# Patient Record
Sex: FEMALE | NOT HISPANIC OR LATINO | Employment: OTHER | URBAN - METROPOLITAN AREA
[De-identification: names, ages, dates, MRNs, and addresses within clinical notes are randomized per-mention and may not be internally consistent; named-entity substitution may affect disease eponyms.]

---

## 2023-01-27 ENCOUNTER — TRANSFERRED RECORDS (OUTPATIENT)
Dept: HEALTH INFORMATION MANAGEMENT | Facility: CLINIC | Age: 73
End: 2023-01-27

## 2023-01-27 LAB
ALT SERPL-CCNC: 23 U/L
AST SERPL-CCNC: 21 U/L (ref 14–36)
CHOLESTEROL (EXTERNAL): 218 MG/DL (ref 0–200)
CREATININE (EXTERNAL): 0.6 MG/DL (ref 0.7–1.4)
GFR ESTIMATED (EXTERNAL): >60 ML/MIN/1.73M2
GLUCOSE (EXTERNAL): 241 MG/DL (ref 60–99)
HBA1C MFR BLD: 10.43 % (ref 4–5.6)
HDLC SERPL-MCNC: 54 MG/DL (ref 35–65)
LDL CHOLESTEROL CALCULATED (EXTERNAL): 118 MG/DL (ref 0–130)
POTASSIUM (EXTERNAL): 4.4 MMOL/L (ref 3.5–5.1)
TRIGLYCERIDES (EXTERNAL): 228 MG/DL (ref 0–200)
TSH SERPL-ACNC: 1.82 MIU/L (ref 0.47–4.68)

## 2023-03-24 ENCOUNTER — MEDICAL CORRESPONDENCE (OUTPATIENT)
Dept: HEALTH INFORMATION MANAGEMENT | Facility: CLINIC | Age: 73
End: 2023-03-24
Payer: MEDICARE

## 2023-03-24 ENCOUNTER — TRANSFERRED RECORDS (OUTPATIENT)
Dept: HEALTH INFORMATION MANAGEMENT | Facility: CLINIC | Age: 73
End: 2023-03-24
Payer: MEDICARE

## 2023-03-27 ENCOUNTER — TRANSCRIBE ORDERS (OUTPATIENT)
Dept: OTHER | Age: 73
End: 2023-03-27

## 2023-03-27 DIAGNOSIS — R07.9 CHEST PAIN: Primary | ICD-10-CM

## 2023-05-05 ENCOUNTER — MEDICAL CORRESPONDENCE (OUTPATIENT)
Dept: HEALTH INFORMATION MANAGEMENT | Facility: CLINIC | Age: 73
End: 2023-05-05
Payer: MEDICARE

## 2023-05-19 ENCOUNTER — TRANSFERRED RECORDS (OUTPATIENT)
Dept: HEALTH INFORMATION MANAGEMENT | Facility: CLINIC | Age: 73
End: 2023-05-19

## 2023-05-25 ENCOUNTER — OFFICE VISIT (OUTPATIENT)
Dept: CARDIOLOGY | Facility: CLINIC | Age: 73
End: 2023-05-25
Payer: MEDICARE

## 2023-05-25 VITALS
BODY MASS INDEX: 30.7 KG/M2 | SYSTOLIC BLOOD PRESSURE: 128 MMHG | RESPIRATION RATE: 16 BRPM | HEIGHT: 64 IN | WEIGHT: 179.8 LBS | DIASTOLIC BLOOD PRESSURE: 64 MMHG | HEART RATE: 72 BPM

## 2023-05-25 DIAGNOSIS — R07.9 CHEST PAIN, UNSPECIFIED TYPE: Primary | ICD-10-CM

## 2023-05-25 DIAGNOSIS — R06.09 EXERTIONAL DYSPNEA: ICD-10-CM

## 2023-05-25 DIAGNOSIS — E11.9 TYPE 2 DIABETES MELLITUS WITHOUT COMPLICATION, UNSPECIFIED WHETHER LONG TERM INSULIN USE (H): ICD-10-CM

## 2023-05-25 DIAGNOSIS — I35.0 NONRHEUMATIC AORTIC VALVE STENOSIS: ICD-10-CM

## 2023-05-25 DIAGNOSIS — I10 PRIMARY HYPERTENSION: ICD-10-CM

## 2023-05-25 PROCEDURE — 99204 OFFICE O/P NEW MOD 45 MIN: CPT | Performed by: INTERNAL MEDICINE

## 2023-05-25 RX ORDER — GLIMEPIRIDE 4 MG/1
4 TABLET ORAL DAILY
COMMUNITY
Start: 2023-05-05

## 2023-05-25 RX ORDER — OMEPRAZOLE 40 MG/1
40 CAPSULE, DELAYED RELEASE ORAL DAILY
COMMUNITY
Start: 2023-03-24

## 2023-05-25 RX ORDER — ROSUVASTATIN CALCIUM 40 MG/1
40 TABLET, COATED ORAL DAILY
COMMUNITY
Start: 2023-05-05 | End: 2024-01-30

## 2023-05-25 RX ORDER — ASPIRIN 325 MG
325 TABLET, DELAYED RELEASE (ENTERIC COATED) ORAL DAILY
COMMUNITY
Start: 2021-09-01

## 2023-05-25 RX ORDER — LISINOPRIL 10 MG/1
10 TABLET ORAL DAILY
COMMUNITY
End: 2024-01-30

## 2023-05-25 NOTE — LETTER
5/25/2023    Alba Hernandez MD  [facility] [address] [place] [id number]    RE: Rina Elizondo       Dear Colleague,     I had the pleasure of seeing Rina Elizondo in the [facility].    HEART CARE ENCOUNTER CONSULTATON NOTE      [facility]  [phone]      Assessment/Recommendations   Assessment:   1.  Exertional dyspnea  2.  Intermittent chest pain  3.  Systolic murmur, likely mild to moderate aortic stenosis vs. LVOT gradient from LV hypertrophy   4.  Diabetes mellitus type II  5.  Hypertension  6.  Hyperlipidemia, LDL: 118  7. DAY  8. Pitting lower ext edema.     Plan:   1.  Coronary CT angiogram will coronary at [facility]  2.  Complete echocardiogram   3.  Continue ASA  4.  Discussed the possibility of further testing such as coronary angiogram or adjustment of medications may be required.  5.  Low-sodium diet  6.  Continue Crestor    Primary language: Angolan     History of Present Illness/Subjective    HPI: Rina Elizondo is a 73 year old female history of hypertension and hyperlipidemia who presents to cardiology clinic at [facility] for dyspnea on exertion, chest pain, lower extremity edema, abnormal stress test.    Patient notes dyspnea with exertion which is been ongoing for a couple of years.  She also notes intermittent episodes of chest discomfort which are with and without exertion.  She has intermittent lower extremity edema.  No orthopnea or PND symptoms.  No recent syncopal episodes.  She has history of intermittent episodes of near syncope.    Long conversation with the patient and her daughter regarding her symptoms and prior history.  I reviewed that prior stress test was equivocal given abnormal EKG portion with normal stress imaging portion at [facility].  In the past she was supposed undergo a Lexiscan stress test but the patient had concerns regarding the medications.  I outlined my concern about  [facility]  Procedure Note           Circumcision:       Baby1 Babs Juarez  MRN# [id number]   Guera 15, 2017, 9:33 AM Indication: parental preference           Procedure performed: Guera 15, 2017, 9:33 AM   Consent: Informed consent was obtained from the parent(s)   Pre-procedure: The area was prepped with betadine, then draped in a sterile fashion. Sterile gloves were worn at all times during the procedure.   Device used: Gomco 1.1 clamp   Anesthesia: Dorsal nerve block - 1% Lidocaine without epinephrine was infiltrated with a total of 0.8cc  Oral sucrose   Blood loss: Less than 1cc    Complications:: None at this time      Procedure:  Informed consent obtained, patient was identified, and patient was prepped in a sterile manner. 0.8 cc of Lidocaine was used as well as sweetease for anesthesia.  Foreskin excised with Gomco.  Patient tolerated the procedure well and no complications noted.          Recorded by Andrew Royal   "coronary artery disease and valvular heart disease as a cause of some of her symptoms.  Also recommend echocardiogram to further evaluate for left ventricular outflow tract gradient as she has noted intermittent episodes of lightheadedness and lower extremity edema.  Interestingly her edema improved with hydration and salt tabs in the past.      She is willing to undergo testing as outlined above.  She will continue her current medications.      Stress Echocardiogram:10/21/2021  Summary     1. The patient exercised for 5 minutes and 5 seconds on the  [place]   protocol.     2. Above normal functional capacity for age.     3. The patient experienced dizziness and heart fluttering during the   stress   test. Symptoms resolved during recovery.     4. Hypertensive response to stress with no ectopy noted.     5. Borderline positive stress ECG for ST segment depression.     6. Normal left ventricular systolic function with no regional wall motion   abnormalities noted at rest.   LVEF 65%.     7. Mild concentric LVH.  Minimal LV outflow gradient at rest.     8. Technically difficult study with off axis views.     9. Cannot rule out mid lateral hypokinesis but this was not seen on   recovery   images.     10. LV cavity appear larger on some stress images but images were off   axis.     11. Equivocal stress echocardiogram recommend a treadmill nuclear stress   test for further evaluation.        Physical Examination  Review of Systems   Vitals: /64 (BP Location: Left arm, Patient Position: Sitting, Cuff Size: Adult Large)   Pulse 72   Resp 16   Ht 1.626 m (5' 4\")   Wt 81.6 kg (179 lb 12.8 oz)   BMI 30.86 kg/m    BMI= Body mass index is 30.86 kg/m .  Wt Readings from Last 3 Encounters:   05/25/23 81.6 kg (179 lb 12.8 oz)        Mild obesity, pleasant   ENT/Mouth: membranes moist, no oral lesions or bleeding gums.      EYES:  no scleral icterus, normal conjunctivae       Chest/Lungs:   lungs are clear to auscultation, " no rales or wheezing, no sternal scar, equal chest wall expansion    Cardiovascular:   Regular. Normal first and second heart sounds with 3 out of 6 mid peaking systolic murmur. No rubs, or gallops; the carotid, radial and posterior tibial pulses are intact, Jugular venous pressure normal, trace edema bilaterally    Abdomen:  no tenderness; bowel sounds are present   Extremities: no cyanosis or clubbing   Skin: no xanthelasma, warm.    Neurologic: normal  bilateral, no tremors     Psychiatric: alert and oriented x3, calm        Please refer above for cardiac ROS details.        Medical History  Surgical History Family History Social History   Past Medical History:   Diagnosis Date    Diabetes mellitus, type 2 (H)     HLD (hyperlipidemia)     HTN (hypertension)      Past Surgical History:   Procedure Laterality Date    GALLBLADDER SURGERY       No history of sudden cardiac death.    Social History     Socioeconomic History    Marital status:      Spouse name: Not on file    Number of children: Not on file    Years of education: Not on file    Highest education level: Not on file   Occupational History    Not on file   Tobacco Use    Smoking status: Not on file    Smokeless tobacco: Not on file   Vaping Use    Vaping status: Not on file   Substance and Sexual Activity    Alcohol use: Not on file    Drug use: Not on file    Sexual activity: Not on file   Other Topics Concern    Not on file   Social History Narrative    Not on file     Social Determinants of Health     Financial Resource Strain: Not on file   Food Insecurity: Not on file   Transportation Needs: Not on file   Physical Activity: Not on file   Stress: Not on file   Social Connections: Not on file   Intimate Partner Violence: Not on file   Housing Stability: Not on file           Medications  Allergies   No current outpatient medications on file.     Not on File       Lab Results    Chemistry/lipid CBC Cardiac Enzymes/BNP/TSH/INR   Recent Labs    Lab Test 01/27/23  1218   TRIG 228*     No results for input(s): LDL in the last 89689 hours.  No results for input(s): NA, POTASSIUM, CHLORIDE, CO2, GLC, BUN, CR, GFRESTIMATED, SHARMILA in the last 34814 hours.    Invalid input(s): GRFESTBLACK  No results for input(s): CR in the last 13948 hours.  No results for input(s): A1C in the last 79586 hours.       No results for input(s): WBC, HGB, HCT, MCV, PLT in the last 66401 hours.  No results for input(s): HGB in the last 56471 hours. No results for input(s): TROPONINI in the last 64615 hours.  No results for input(s): BNP, NTBNPI, NTBNP in the last 71925 hours.  No results for input(s): TSH in the last 49798 hours.  No results for input(s): INR in the last 76147 hours.     Kit Bradshaw DO                                          Thank you for allowing me to participate in the care of your patient.      Sincerely,     Kit Bradshaw DO     [facility]  cc:   No referring provider defined for this encounter.

## 2023-05-25 NOTE — PATIENT INSTRUCTIONS
Please contact direct nurses line Monday through Friday 8 AM to 5 PM @ [phone]    After-hours contact cardiology office at [phone].

## 2023-05-25 NOTE — PROGRESS NOTES
HEART CARE ENCOUNTER CONSULTATON NOTE      [facility]  [phone]      Assessment/Recommendations   Assessment:   1.  Exertional dyspnea  2.  Intermittent chest pain  3.  Systolic murmur, likely mild to moderate aortic stenosis vs. LVOT gradient from LV hypertrophy   4.  Diabetes mellitus type II  5.  Hypertension  6.  Hyperlipidemia, LDL: 118  7. DAY  8. Pitting lower ext edema.     Plan:   1.  Coronary CT angiogram will coronary at [facility]  2.  Complete echocardiogram   3.  Continue ASA  4.  Discussed the possibility of further testing such as coronary angiogram or adjustment of medications may be required.  5.  Low-sodium diet  6.  Continue Crestor    Primary language: Burmese     History of Present Illness/Subjective    HPI: Rina Elizondo is a 73 year old female history of hypertension and hyperlipidemia who presents to cardiology clinic at [facility] for dyspnea on exertion, chest pain, lower extremity edema, abnormal stress test.    Patient notes dyspnea with exertion which is been ongoing for a couple of years.  She also notes intermittent episodes of chest discomfort which are with and without exertion.  She has intermittent lower extremity edema.  No orthopnea or PND symptoms.  No recent syncopal episodes.  She has history of intermittent episodes of near syncope.    Long conversation with the patient and her daughter regarding her symptoms and prior history.  I reviewed that prior stress test was equivocal given abnormal EKG portion with normal stress imaging portion at [facility].  In the past she was supposed undergo a Lexiscan stress test but the patient had concerns regarding the medications.  I outlined my concern about coronary artery disease and valvular heart disease as a cause of some of her symptoms.  Also recommend echocardiogram to further evaluate for left ventricular outflow tract gradient as she has noted intermittent episodes of  "lightheadedness and lower extremity edema.  Interestingly her edema improved with hydration and salt tabs in the past.      She is willing to undergo testing as outlined above.  She will continue her current medications.      Stress Echocardiogram:10/21/2021  Summary     1. The patient exercised for 5 minutes and 5 seconds on the  [place]   protocol.     2. Above normal functional capacity for age.     3. The patient experienced dizziness and heart fluttering during the   stress   test. Symptoms resolved during recovery.     4. Hypertensive response to stress with no ectopy noted.     5. Borderline positive stress ECG for ST segment depression.     6. Normal left ventricular systolic function with no regional wall motion   abnormalities noted at rest.   LVEF 65%.     7. Mild concentric LVH.  Minimal LV outflow gradient at rest.     8. Technically difficult study with off axis views.     9. Cannot rule out mid lateral hypokinesis but this was not seen on   recovery   images.     10. LV cavity appear larger on some stress images but images were off   axis.     11. Equivocal stress echocardiogram recommend a treadmill nuclear stress   test for further evaluation.        Physical Examination  Review of Systems   Vitals: /64 (BP Location: Left arm, Patient Position: Sitting, Cuff Size: Adult Large)   Pulse 72   Resp 16   Ht 1.626 m (5' 4\")   Wt 81.6 kg (179 lb 12.8 oz)   BMI 30.86 kg/m    BMI= Body mass index is 30.86 kg/m .  Wt Readings from Last 3 Encounters:   05/25/23 81.6 kg (179 lb 12.8 oz)        Mild obesity, pleasant   ENT/Mouth: membranes moist, no oral lesions or bleeding gums.      EYES:  no scleral icterus, normal conjunctivae       Chest/Lungs:   lungs are clear to auscultation, no rales or wheezing, no sternal scar, equal chest wall expansion    Cardiovascular:   Regular. Normal first and second heart sounds with 3 out of 6 mid peaking systolic murmur. No rubs, or gallops; the carotid, radial and " posterior tibial pulses are intact, Jugular venous pressure normal, trace edema bilaterally    Abdomen:  no tenderness; bowel sounds are present   Extremities: no cyanosis or clubbing   Skin: no xanthelasma, warm.    Neurologic: normal  bilateral, no tremors     Psychiatric: alert and oriented x3, calm        Please refer above for cardiac ROS details.        Medical History  Surgical History Family History Social History   Past Medical History:   Diagnosis Date     Diabetes mellitus, type 2 (H)      HLD (hyperlipidemia)      HTN (hypertension)      Past Surgical History:   Procedure Laterality Date     GALLBLADDER SURGERY       No history of sudden cardiac death.    Social History     Socioeconomic History     Marital status:      Spouse name: Not on file     Number of children: Not on file     Years of education: Not on file     Highest education level: Not on file   Occupational History     Not on file   Tobacco Use     Smoking status: Not on file     Smokeless tobacco: Not on file   Vaping Use     Vaping status: Not on file   Substance and Sexual Activity     Alcohol use: Not on file     Drug use: Not on file     Sexual activity: Not on file   Other Topics Concern     Not on file   Social History Narrative     Not on file     Social Determinants of Health     Financial Resource Strain: Not on file   Food Insecurity: Not on file   Transportation Needs: Not on file   Physical Activity: Not on file   Stress: Not on file   Social Connections: Not on file   Intimate Partner Violence: Not on file   Housing Stability: Not on file           Medications  Allergies   No current outpatient medications on file.     Not on File       Lab Results    Chemistry/lipid CBC Cardiac Enzymes/BNP/TSH/INR   Recent Labs   Lab Test 01/27/23  1218   TRIG 228*     No results for input(s): LDL in the last 74897 hours.  No results for input(s): NA, POTASSIUM, CHLORIDE, CO2, GLC, BUN, CR, GFRESTIMATED, SHARMILA in the last 65586  hours.    Invalid input(s): GRFESTBLACK  No results for input(s): CR in the last 88763 hours.  No results for input(s): A1C in the last 99495 hours.       No results for input(s): WBC, HGB, HCT, MCV, PLT in the last 49351 hours.  No results for input(s): HGB in the last 00323 hours. No results for input(s): TROPONINI in the last 88007 hours.  No results for input(s): BNP, NTBNPI, NTBNP in the last 95510 hours.  No results for input(s): TSH in the last 91750 hours.  No results for input(s): INR in the last 05624 hours.     Kit Bradshaw, DO

## 2023-07-20 ENCOUNTER — HOSPITAL ENCOUNTER (OUTPATIENT)
Dept: CARDIOLOGY | Facility: CLINIC | Age: 73
Discharge: HOME | End: 2023-07-20
Attending: INTERNAL MEDICINE
Payer: MEDICARE

## 2023-07-20 ENCOUNTER — HOSPITAL ENCOUNTER (OUTPATIENT)
Dept: CT IMAGING | Facility: CLINIC | Age: 73
Discharge: HOME | End: 2023-07-20
Attending: INTERNAL MEDICINE
Payer: MEDICARE

## 2023-07-20 VITALS — DIASTOLIC BLOOD PRESSURE: 62 MMHG | SYSTOLIC BLOOD PRESSURE: 139 MMHG

## 2023-07-20 DIAGNOSIS — I35.0 NONRHEUMATIC AORTIC VALVE STENOSIS: ICD-10-CM

## 2023-07-20 DIAGNOSIS — R06.09 EXERTIONAL DYSPNEA: ICD-10-CM

## 2023-07-20 DIAGNOSIS — R07.9 CHEST PAIN, UNSPECIFIED TYPE: ICD-10-CM

## 2023-07-20 DIAGNOSIS — E11.9 TYPE 2 DIABETES MELLITUS WITHOUT COMPLICATION, UNSPECIFIED WHETHER LONG TERM INSULIN USE (H): ICD-10-CM

## 2023-07-20 DIAGNOSIS — I10 PRIMARY HYPERTENSION: ICD-10-CM

## 2023-07-20 LAB
BSA FOR ECHO PROCEDURE: 0 M2
CREAT BLD-MCNC: 0.8 MG/DL (ref 0.6–1.1)
GFR SERPL CREATININE-BSD FRML MDRD: >60 ML/MIN/1.73M2
LVEF ECHO: NORMAL

## 2023-07-20 PROCEDURE — 250N000011 HC RX IP 250 OP 636: Mod: JZ | Performed by: INTERNAL MEDICINE

## 2023-07-20 PROCEDURE — 75574 CT ANGIO HRT W/3D IMAGE: CPT | Mod: ME

## 2023-07-20 PROCEDURE — 75574 CT ANGIO HRT W/3D IMAGE: CPT | Mod: 26 | Performed by: INTERNAL MEDICINE

## 2023-07-20 PROCEDURE — G1010 CDSM STANSON: HCPCS

## 2023-07-20 PROCEDURE — 255N000002 HC RX 255 OP 636: Performed by: INTERNAL MEDICINE

## 2023-07-20 PROCEDURE — 250N000013 HC RX MED GY IP 250 OP 250 PS 637: Performed by: INTERNAL MEDICINE

## 2023-07-20 PROCEDURE — 93306 TTE W/DOPPLER COMPLETE: CPT | Mod: 26 | Performed by: INTERNAL MEDICINE

## 2023-07-20 PROCEDURE — 82565 ASSAY OF CREATININE: CPT

## 2023-07-20 PROCEDURE — G1010 CDSM STANSON: HCPCS | Performed by: INTERNAL MEDICINE

## 2023-07-20 RX ORDER — DILTIAZEM HYDROCHLORIDE 5 MG/ML
10 INJECTION INTRAVENOUS
Status: DISCONTINUED | OUTPATIENT
Start: 2023-07-20 | End: 2023-07-21 | Stop reason: HOSPADM

## 2023-07-20 RX ORDER — METOPROLOL TARTRATE 1 MG/ML
5 INJECTION, SOLUTION INTRAVENOUS
Status: DISCONTINUED | OUTPATIENT
Start: 2023-07-20 | End: 2023-07-21 | Stop reason: HOSPADM

## 2023-07-20 RX ORDER — LIDOCAINE 40 MG/G
CREAM TOPICAL
Status: DISCONTINUED | OUTPATIENT
Start: 2023-07-20 | End: 2023-07-21 | Stop reason: HOSPADM

## 2023-07-20 RX ORDER — IOPAMIDOL 755 MG/ML
100 INJECTION, SOLUTION INTRAVASCULAR ONCE
Status: COMPLETED | OUTPATIENT
Start: 2023-07-20 | End: 2023-07-20

## 2023-07-20 RX ORDER — DILTIAZEM HYDROCHLORIDE 5 MG/ML
5 INJECTION INTRAVENOUS
Status: DISCONTINUED | OUTPATIENT
Start: 2023-07-20 | End: 2023-07-21 | Stop reason: HOSPADM

## 2023-07-20 RX ORDER — NITROGLYCERIN 0.4 MG/1
0.4 TABLET SUBLINGUAL ONCE
Status: COMPLETED | OUTPATIENT
Start: 2023-07-20 | End: 2023-07-20

## 2023-07-20 RX ADMIN — PERFLUTREN 2 ML: 6.52 INJECTION, SUSPENSION INTRAVENOUS at 09:20

## 2023-07-20 RX ADMIN — IOPAMIDOL 100 ML: 755 INJECTION, SOLUTION INTRAVENOUS at 08:19

## 2023-07-20 RX ADMIN — NITROGLYCERIN 0.4 MG: 0.4 TABLET SUBLINGUAL at 08:16

## 2023-07-20 NOTE — RESULT ENCOUNTER NOTE
Please inform patient that CT angiogram was without significant CAD.  Echo was reassuring without concerning finds (only mild aortic valve stenosis).  No cardiac cause of dyspnea identified.  Please inquire how patient is feeling.

## 2024-01-12 ENCOUNTER — TRANSFERRED RECORDS (OUTPATIENT)
Dept: HEALTH INFORMATION MANAGEMENT | Facility: CLINIC | Age: 74
End: 2024-01-12
Payer: MEDICARE

## 2024-01-30 ENCOUNTER — OFFICE VISIT (OUTPATIENT)
Dept: CARDIOLOGY | Facility: CLINIC | Age: 74
End: 2024-01-30
Payer: MEDICARE

## 2024-01-30 VITALS
HEART RATE: 64 BPM | WEIGHT: 188.3 LBS | BODY MASS INDEX: 34.65 KG/M2 | HEIGHT: 62 IN | SYSTOLIC BLOOD PRESSURE: 140 MMHG | DIASTOLIC BLOOD PRESSURE: 60 MMHG | RESPIRATION RATE: 16 BRPM

## 2024-01-30 DIAGNOSIS — E11.9 TYPE 2 DIABETES MELLITUS WITHOUT COMPLICATION, WITHOUT LONG-TERM CURRENT USE OF INSULIN (H): ICD-10-CM

## 2024-01-30 DIAGNOSIS — I10 BENIGN ESSENTIAL HYPERTENSION: ICD-10-CM

## 2024-01-30 DIAGNOSIS — E66.811 CLASS 1 OBESITY DUE TO EXCESS CALORIES WITH SERIOUS COMORBIDITY AND BODY MASS INDEX (BMI) OF 34.0 TO 34.9 IN ADULT: ICD-10-CM

## 2024-01-30 DIAGNOSIS — I51.7 LVH (LEFT VENTRICULAR HYPERTROPHY): Primary | ICD-10-CM

## 2024-01-30 DIAGNOSIS — I35.0 NONRHEUMATIC AORTIC VALVE STENOSIS: ICD-10-CM

## 2024-01-30 DIAGNOSIS — E66.09 CLASS 1 OBESITY DUE TO EXCESS CALORIES WITH SERIOUS COMORBIDITY AND BODY MASS INDEX (BMI) OF 34.0 TO 34.9 IN ADULT: ICD-10-CM

## 2024-01-30 PROCEDURE — 99214 OFFICE O/P EST MOD 30 MIN: CPT | Performed by: INTERNAL MEDICINE

## 2024-01-30 RX ORDER — LISINOPRIL 10 MG/1
10 TABLET ORAL DAILY
Qty: 90 TABLET | Refills: 3 | Status: SHIPPED | OUTPATIENT
Start: 2024-01-30

## 2024-01-30 NOTE — PROGRESS NOTES
HEART CARE ENCOUNTER CONSULTATON NOTE      [facility]  [phone]      Assessment/Recommendations   Assessment:   1.  Exertional dyspnea, stable, mild improvement.    2.  LV diastolic function with left ventricular hypertrophy (moderate)  3.  Mild aortic stenosis.   4.  Diabetes mellitus type II  5.  Hypertension  6.  Hyperlipidemia, LDL: 74 (goal~70).    7. DAY  8. Mild non-obstructive CAD   9. BMI: 34.     Plan:   1.  Restart lisinopril 10 mg daily  2.  Currently has no lower extremity edema.  No indication for diuretic therapy  3.  Lower sodium intake 2 to 3000 mg daily  4.  Continue Crestor for diabetes and hyperlipidemia  5.  Repeat echocardiogram in 3 years to follow aortic stenosis    Primary language: New Zealander     History of Present Illness/Subjective    HPI: Rina Elizondo is a 74 year old female history of hypertension and hyperlipidemia who presents to cardiology clinic in follow-up for dyspnea exertion.    Since last evaluation patient underwent coronary CT angiogram which demonstrated no significant obstructive disease.  She has mild stenosis in her LAD and left circumflex.  She currently has no chest pain symptoms.  She has noticed some improvement in her dyspnea on exertion.  She has stopped her lisinopril which I recommend she restarts.  She denies any lightheadedness or syncope.  Her lower extreme edema is resolved.  Advised her to maintain a low-sodium diet.  Would do less than 3000 mg/day.  Discussed considerations for diuretic therapy if significant fluid retention.    Yana diastolic dysfunction and often to hypertrophy.  Discussed aortic stenosis as well      ECHO: 7/20/23  1. The left ventricle is normal in size. Left ventricular function is  normal.The ejection fraction is 60-65%. There is mild to moderate concentric  left ventricular hypertrophy. No regional wall motion abnormalities noted.  Left ventricular diastolic function is abnormal.  2. Normal right ventricle  size and systolic function.  3. There is moderate trileaflet aortic sclerosis. No hemodynamically  significant valvular aortic stenosis (peak teodoro: 1.9 m/sec.).  4. The ascending aorta is Borderline dilated at 39 mm.  5. Normal RA pressure of 3 mmHg. Right ventricular systolic pressure could not  be approximated due to inadequate tricuspid regurgitation.    Coronary CTA: 7/20/23  Diagnostic  Dominance: Right  Left Main   Normal.      Left Anterior Descending   Mid LAD lesion   Mid LAD lesion has mild luminal stenosis in the range of 25-49%. Predominant non-calcified plaque has been detected.      Left Circumflex   Prox Cx to Mid Cx lesion   Prox Cx to Mid Cx lesion has mild luminal stenosis in the range of 25-49%. Predominant non-calcified plaque has been detected.      Right Coronary Artery   The vessel exhibits minimal luminal irregularities.          Stress Echocardiogram:10/21/2021  Summary     1. The patient exercised for 5 minutes and 5 seconds on the  [place]   protocol.     2. Above normal functional capacity for age.     3. The patient experienced dizziness and heart fluttering during the   stress   test. Symptoms resolved during recovery.     4. Hypertensive response to stress with no ectopy noted.     5. Borderline positive stress ECG for ST segment depression.     6. Normal left ventricular systolic function with no regional wall motion   abnormalities noted at rest.   LVEF 65%.     7. Mild concentric LVH.  Minimal LV outflow gradient at rest.     8. Technically difficult study with off axis views.     9. Cannot rule out mid lateral hypokinesis but this was not seen on   recovery   images.     10. LV cavity appear larger on some stress images but images were off   axis.     11. Equivocal stress echocardiogram recommend a treadmill nuclear stress   test for further evaluation.        Physical Examination  Review of Systems   Vitals: BP (!) 140/60 (BP Location: Right arm, Patient Position: Sitting, Cuff Size:  "Adult Large)   Pulse 64   Resp 16   Ht 1.575 m (5' 2\")   Wt 85.4 kg (188 lb 4.8 oz)   BMI 34.44 kg/m    BMI= Body mass index is 34.44 kg/m .  Wt Readings from Last 3 Encounters:   01/30/24 85.4 kg (188 lb 4.8 oz)   05/25/23 81.6 kg (179 lb 12.8 oz)        Mild obesity, pleasant   ENT/Mouth: membranes moist, no oral lesions or bleeding gums.      EYES:  no scleral icterus, normal conjunctivae       Chest/Lungs:   lungs are clear to auscultation, no rales or wheezing, no sternal scar, equal chest wall expansion    Cardiovascular:   Regular. Normal first and second heart sounds with 3 out of 6 mid peaking systolic murmur. No rubs, or gallops; the carotid, radial and posterior tibial pulses are intact, Jugular venous pressure normal, trace edema bilaterally    Abdomen:  no tenderness; bowel sounds are present   Extremities: no cyanosis or clubbing   Skin: no xanthelasma, warm.    Neurologic: normal  bilateral, no tremors     Psychiatric: alert and oriented x3, calm        Please refer above for cardiac ROS details.        Medical History  Surgical History Family History Social History   Past Medical History:   Diagnosis Date    Diabetes mellitus, type 2 (H)     HLD (hyperlipidemia)     HTN (hypertension)      Past Surgical History:   Procedure Laterality Date    GALLBLADDER SURGERY       No history of sudden cardiac death.    Social History     Socioeconomic History    Marital status:      Spouse name: Not on file    Number of children: Not on file    Years of education: Not on file    Highest education level: Not on file   Occupational History    Not on file   Tobacco Use    Smoking status: Never    Smokeless tobacco: Never   Vaping Use    Vaping Use: Never used   Substance and Sexual Activity    Alcohol use: Not on file    Drug use: Never    Sexual activity: Not on file   Other Topics Concern    Not on file   Social History Narrative    Not on file     Social Determinants of Health     Financial " Resource Strain: Not on file   Food Insecurity: Not on file   Transportation Needs: Not on file   Physical Activity: Not on file   Stress: Not on file   Social Connections: Not on file   Interpersonal Safety: Not on file   Housing Stability: Not on file           Medications  Allergies   Current Outpatient Medications   Medication Sig Dispense Refill    glimepiride (AMARYL) 4 MG tablet Take 4 mg by mouth daily      metFORMIN (GLUCOPHAGE) 500 MG tablet Take 500 mg by mouth 2 times daily (with meals)      rosuvastatin (CRESTOR) 40 MG tablet Take 40 mg by mouth daily      aspirin (ASA) 325 MG EC tablet Take 325 mg by mouth daily (Patient not taking: Reported on 1/30/2024)      lisinopril (ZESTRIL) 10 MG tablet Take 10 mg by mouth daily (Patient not taking: Reported on 5/25/2023)      omeprazole (PRILOSEC) 40 MG DR capsule Take 40 mg by mouth daily (Patient not taking: Reported on 1/30/2024)       No Known Allergies       Lab Results    Chemistry/lipid CBC Cardiac Enzymes/BNP/TSH/INR   Recent Labs   Lab Test 01/27/23  1218   TRIG 228*     No results for input(s): LDL in the last 07470 hours.  No results for input(s): NA, POTASSIUM, CHLORIDE, CO2, GLC, BUN, CR, GFRESTIMATED, SHARMILA in the last 62294 hours.    Invalid input(s): GRFESTBLACK  No results for input(s): CR in the last 99897 hours.  No results for input(s): A1C in the last 13210 hours.       No results for input(s): WBC, HGB, HCT, MCV, PLT in the last 79216 hours.  No results for input(s): HGB in the last 18633 hours. No results for input(s): TROPONINI in the last 01744 hours.  No results for input(s): BNP, NTBNPI, NTBNP in the last 94867 hours.  No results for input(s): TSH in the last 45310 hours.  No results for input(s): INR in the last 10769 hours.     Kit Bradshaw DO

## 2024-01-30 NOTE — LETTER
1/30/2024    Alba Hernandez MD  [facility] [address] [place] [id number]    RE: Rina Elizondo       Dear Colleague,     I had the pleasure of seeing Rina Elizondo in the [facility].    HEART CARE ENCOUNTER CONSULTATON NOTE      [facility]  [phone]      Assessment/Recommendations   Assessment:   1.  Exertional dyspnea, stable, mild improvement.    2.  LV diastolic function with left ventricular hypertrophy (moderate)  3.  Mild aortic stenosis.   4.  Diabetes mellitus type II  5.  Hypertension  6.  Hyperlipidemia, LDL: 74 (goal~70).    7. DAY  8. Mild non-obstructive CAD   9. BMI: 34.     Plan:   1.  Restart lisinopril 10 mg daily  2.  Currently has no lower extremity edema.  No indication for diuretic therapy  3.  Lower sodium intake 2 to 3000 mg daily  4.  Continue Crestor for diabetes and hyperlipidemia  5.  Repeat echocardiogram in 3 years to follow aortic stenosis    Primary language: Senegalese     History of Present Illness/Subjective    HPI: Rina Elizondo is a 74 year old female history of hypertension and hyperlipidemia who presents to cardiology clinic in follow-up for dyspnea exertion.    Since last evaluation patient underwent coronary CT angiogram which demonstrated no significant obstructive disease.  She has mild stenosis in her LAD and left circumflex.  She currently has no chest pain symptoms.  She has noticed some improvement in her dyspnea on exertion.  She has stopped her lisinopril which I recommend she restarts.  She denies any lightheadedness or syncope.  Her lower extreme edema is resolved.  Advised her to maintain a low-sodium diet.  Would do less than 3000 mg/day.  Discussed considerations for diuretic therapy if significant fluid retention.    Yana diastolic dysfunction and often to hypertrophy.  Discussed aortic stenosis as well      ECHO: 7/20/23  1. The left ventricle is normal in size. Left ventricular function  is  normal.The ejection fraction is 60-65%. There is mild to moderate concentric  left ventricular hypertrophy. No regional wall motion abnormalities noted.  Left ventricular diastolic function is abnormal.  2. Normal right ventricle size and systolic function.  3. There is moderate trileaflet aortic sclerosis. No hemodynamically  significant valvular aortic stenosis (peak teodoro: 1.9 m/sec.).  4. The ascending aorta is Borderline dilated at 39 mm.  5. Normal RA pressure of 3 mmHg. Right ventricular systolic pressure could not  be approximated due to inadequate tricuspid regurgitation.    Coronary CTA: 7/20/23  Diagnostic  Dominance: Right  Left Main   Normal.      Left Anterior Descending   Mid LAD lesion   Mid LAD lesion has mild luminal stenosis in the range of 25-49%. Predominant non-calcified plaque has been detected.      Left Circumflex   Prox Cx to Mid Cx lesion   Prox Cx to Mid Cx lesion has mild luminal stenosis in the range of 25-49%. Predominant non-calcified plaque has been detected.      Right Coronary Artery   The vessel exhibits minimal luminal irregularities.          Stress Echocardiogram:10/21/2021  Summary     1. The patient exercised for 5 minutes and 5 seconds on the  [place]   protocol.     2. Above normal functional capacity for age.     3. The patient experienced dizziness and heart fluttering during the   stress   test. Symptoms resolved during recovery.     4. Hypertensive response to stress with no ectopy noted.     5. Borderline positive stress ECG for ST segment depression.     6. Normal left ventricular systolic function with no regional wall motion   abnormalities noted at rest.   LVEF 65%.     7. Mild concentric LVH.  Minimal LV outflow gradient at rest.     8. Technically difficult study with off axis views.     9. Cannot rule out mid lateral hypokinesis but this was not seen on   recovery   images.     10. LV cavity appear larger on some stress images but images were off   axis.     11.  "Equivocal stress echocardiogram recommend a treadmill nuclear stress   test for further evaluation.        Physical Examination  Review of Systems   Vitals: BP (!) 140/60 (BP Location: Right arm, Patient Position: Sitting, Cuff Size: Adult Large)   Pulse 64   Resp 16   Ht 1.575 m (5' 2\")   Wt 85.4 kg (188 lb 4.8 oz)   BMI 34.44 kg/m    BMI= Body mass index is 34.44 kg/m .  Wt Readings from Last 3 Encounters:   01/30/24 85.4 kg (188 lb 4.8 oz)   05/25/23 81.6 kg (179 lb 12.8 oz)        Mild obesity, pleasant   ENT/Mouth: membranes moist, no oral lesions or bleeding gums.      EYES:  no scleral icterus, normal conjunctivae       Chest/Lungs:   lungs are clear to auscultation, no rales or wheezing, no sternal scar, equal chest wall expansion    Cardiovascular:   Regular. Normal first and second heart sounds with 3 out of 6 mid peaking systolic murmur. No rubs, or gallops; the carotid, radial and posterior tibial pulses are intact, Jugular venous pressure normal, trace edema bilaterally    Abdomen:  no tenderness; bowel sounds are present   Extremities: no cyanosis or clubbing   Skin: no xanthelasma, warm.    Neurologic: normal  bilateral, no tremors     Psychiatric: alert and oriented x3, calm        Please refer above for cardiac ROS details.        Medical History  Surgical History Family History Social History   Past Medical History:   Diagnosis Date    Diabetes mellitus, type 2 (H)     HLD (hyperlipidemia)     HTN (hypertension)      Past Surgical History:   Procedure Laterality Date    GALLBLADDER SURGERY       No history of sudden cardiac death.    Social History     Socioeconomic History    Marital status:      Spouse name: Not on file    Number of children: Not on file    Years of education: Not on file    Highest education level: Not on file   Occupational History    Not on file   Tobacco Use    Smoking status: Never    Smokeless tobacco: Never   Vaping Use    Vaping Use: Never used   Substance " and Sexual Activity    Alcohol use: Not on file    Drug use: Never    Sexual activity: Not on file   Other Topics Concern    Not on file   Social History Narrative    Not on file     Social Determinants of Health     Financial Resource Strain: Not on file   Food Insecurity: Not on file   Transportation Needs: Not on file   Physical Activity: Not on file   Stress: Not on file   Social Connections: Not on file   Interpersonal Safety: Not on file   Housing Stability: Not on file           Medications  Allergies   Current Outpatient Medications   Medication Sig Dispense Refill    glimepiride (AMARYL) 4 MG tablet Take 4 mg by mouth daily      metFORMIN (GLUCOPHAGE) 500 MG tablet Take 500 mg by mouth 2 times daily (with meals)      rosuvastatin (CRESTOR) 40 MG tablet Take 40 mg by mouth daily      aspirin (ASA) 325 MG EC tablet Take 325 mg by mouth daily (Patient not taking: Reported on 1/30/2024)      lisinopril (ZESTRIL) 10 MG tablet Take 10 mg by mouth daily (Patient not taking: Reported on 5/25/2023)      omeprazole (PRILOSEC) 40 MG DR capsule Take 40 mg by mouth daily (Patient not taking: Reported on 1/30/2024)       No Known Allergies       Lab Results    Chemistry/lipid CBC Cardiac Enzymes/BNP/TSH/INR   Recent Labs   Lab Test 01/27/23  1218   TRIG 228*     No results for input(s): LDL in the last 03351 hours.  No results for input(s): NA, POTASSIUM, CHLORIDE, CO2, GLC, BUN, CR, GFRESTIMATED, SHARMILA in the last 22241 hours.    Invalid input(s): GRFESTBLACK  No results for input(s): CR in the last 51079 hours.  No results for input(s): A1C in the last 80155 hours.       No results for input(s): WBC, HGB, HCT, MCV, PLT in the last 88062 hours.  No results for input(s): HGB in the last 92700 hours. No results for input(s): TROPONINI in the last 85729 hours.  No results for input(s): BNP, NTBNPI, NTBNP in the last 73867 hours.  No results for input(s): TSH in the last 41577 hours.  No results for input(s): INR in the last  18236 hours.     Kit Bradshaw DO              Thank you for allowing me to participate in the care of your patient.      Sincerely,     Kit Bradshaw DO     [facility]  cc: No referring provider defined for this encounter.

## 2024-01-30 NOTE — PATIENT INSTRUCTIONS
Please contact direct nurses line Monday through Friday 8 AM to 5 PM @ [phone]    After-hours contact cardiology office at [phone].    Plan:   Restart lisinopril   Low sodium intake to <3000 mg per daily  Can proceed with colonoscope and endoscopy without further testing from cardiac standpoint.

## 2024-03-11 ENCOUNTER — TELEPHONE (OUTPATIENT)
Dept: CARDIOLOGY | Facility: CLINIC | Age: 74
End: 2024-03-11
Payer: MEDICARE

## 2024-03-11 NOTE — TELEPHONE ENCOUNTER
Spoke with Pb at [facility]. With discussion, able to discover verbiage to speak to proceeding with Colonoscopy/EGD in pt instructions portion. Fax # for [facility] [phone]. Will call daughter Mar, to inform/update. Consent to communicate on file, speaks English. CMM,Rn      Faxed to [facility]. [facility] to daughter to inform of documentation needed, faxed to [facility]. Verbalized understanding. [email or web address]

## 2024-03-11 NOTE — TELEPHONE ENCOUNTER
[facility] Call Center    Phone Message    May a detailed message be left on voicemail: yes     Reason for Call: Other: [facility] is requesting cardiac clearance  for EGD and colonoscopy on 3/18. Can patient have these procedures?     Action Taken: Other: cardio    Travel Screening: Not Applicable